# Patient Record
Sex: FEMALE | Race: BLACK OR AFRICAN AMERICAN | Employment: OTHER | URBAN - METROPOLITAN AREA
[De-identification: names, ages, dates, MRNs, and addresses within clinical notes are randomized per-mention and may not be internally consistent; named-entity substitution may affect disease eponyms.]

---

## 2017-01-03 ENCOUNTER — TELEPHONE (OUTPATIENT)
Dept: FAMILY MEDICINE CLINIC | Age: 47
End: 2017-01-03

## 2017-01-03 DIAGNOSIS — K42.9 UMBILICAL HERNIA WITHOUT OBSTRUCTION AND WITHOUT GANGRENE: Primary | ICD-10-CM

## 2017-01-03 NOTE — TELEPHONE ENCOUNTER
Pt called back, stating she called earlier to get the results of CT of her abdomen done 12/29/16 and wants call back to advise.

## 2017-01-03 NOTE — TELEPHONE ENCOUNTER
----- Message from Stephanie Alcantara sent at 1/3/2017 10:49 AM EST -----  Regarding: Dr. Abhi Fernandez Telephone  Patient would like a call back regarding her test results.  Contact number is [phone]

## 2017-01-04 NOTE — TELEPHONE ENCOUNTER
Please call patient and let her know she has a small umbilical hernia, and I would like for her to see surgery. I have printed a referral request.  In the mean time, it is OK to fly.

## 2017-01-04 NOTE — TELEPHONE ENCOUNTER
Pt called again requesting abdominal CT results and wants to make sure she's okay to travel. Please call to advise.

## 2017-01-04 NOTE — TELEPHONE ENCOUNTER
Called and spoke with pt, and she has been advised of her results. Pt states she would like to speak with Dr. Lydia Dotson personally about her results and would like for her to call her today.

## 2017-01-05 NOTE — TELEPHONE ENCOUNTER
Patient called back and was very upset that she has not spoken with the doctor. Please contact her back at [phone].

## 2017-01-17 ENCOUNTER — OFFICE VISIT (OUTPATIENT)
Dept: SURGERY | Age: 47
End: 2017-01-17

## 2017-01-17 VITALS
RESPIRATION RATE: 14 BRPM | HEART RATE: 89 BPM | HEIGHT: 67 IN | TEMPERATURE: 98.8 F | WEIGHT: 192 LBS | SYSTOLIC BLOOD PRESSURE: 103 MMHG | BODY MASS INDEX: 30.13 KG/M2 | OXYGEN SATURATION: 97 % | DIASTOLIC BLOOD PRESSURE: 66 MMHG

## 2017-01-17 DIAGNOSIS — K42.9 UMBILICAL HERNIA WITHOUT OBSTRUCTION AND WITHOUT GANGRENE: Primary | ICD-10-CM

## 2017-01-17 NOTE — PROGRESS NOTES
1. Have you been to the ER, urgent care clinic since your last visit? Hospitalized since your last visit?no    2. Have you seen or consulted any other health care providers outside of the [address] [place] [address] since your last visit? Include any pap smears or colon screening.  no

## 2017-01-20 NOTE — PROGRESS NOTES
Surgery Consult    Subjective:      Lennox Citizen is a 40 y.o. female who is being seen for umbilical hernia. Patient complains of an annoying discomfort at times at her umbilicus. She denies pain, nausea, bloating, and change in bowel habits. Symptoms were first noted a few weeks ago. Lump is reducible. Patient does not have symptoms of chronic constipation, chronic cough, difficulty urinating. Patient has a history of a BTL through the umbilicus. She developed a keloid at the site. She states she has a severe problem with keloids      Patient Active Problem List    Diagnosis Date Noted    Menorrhagia 09/19/2014    OA (osteoarthritis) of knee 03/10/2013    Varicose veins 09/14/2012    Iron deficiency anemia 08/03/2012    Menstrual migraine 07/26/2011      Past Medical History   Diagnosis Date    Acute PID (pelvic inflammatory disease) 12/04/13    Anemia     Anemia NEC     Iron deficiency anemia 8/3/2012    Menorrhagia 9/19/2014    Migraines     Varicose veins 9/14/2012     Past Surgical History   Procedure Laterality Date    Endoscopy, colon, diagnostic  02/09    Hx tubal ligation       Family History   Problem Relation Age of Onset    Hypertension Mother     Diabetes Mother     Cancer Mother      cervical    Hypertension Father     Diabetes Father     Hypertension Sister     Diabetes Sister     Diabetes Maternal Grandmother     Diabetes Maternal Grandfather     Diabetes Paternal Grandmother     Diabetes Paternal Grandfather     Hypertension Sister     Diabetes Sister       Social History   Substance Use Topics    Smoking status: Never Smoker    Smokeless tobacco: Never Used    Alcohol use No      No current outpatient prescriptions on file. No current facility-administered medications for this visit. No Known Allergies     Review of Systems:  A comprehensive review of systems was negative except for that written in the History of Present Illness.      Objective: Blood pressure 103/66, pulse 89, temperature 98.8 °F (37.1 °C), temperature source Oral, resp. rate 14, height 5' 7\" (1.702 m), weight 192 lb (87.1 kg), last menstrual period 12/25/2016, SpO2 97 %. Physical Exam:  General:  Alert, cooperative, no distress, appears stated age. Eyes:  Conjunctivae/corneas clear. , EOMs intact. Chest:   Large scar at breast biopsy site   Abdomen:   Soft, non-tender. Bowel sounds normal. No masses,  No organomegaly. Keloid at umbilicus. Reducible UH     CT (examed by me personally) -  Umbilical hernia, no bowel obstruction. Dictated as containing a loop of bowel but I disagree    Assessment:     Umbilical which is easily reducible. Recommend watchful waiting    Plan:     1. Discussed possibility of incarceration, strangulation, enlargement in size over time, and the risk of emergency surgery in the face of strangulation. Also discussed the risks of surgery including recurrence which can be up to 50% in the case of incisional or complex hernias, use of prosthetic materials (mesh) and the increased risk of infection, postoperative infection and the possible need for reoperation and removal of mesh if used, possibility of postoperative small bowel obstruction or ileus, and the risks of general anesthetic. The patient understands the risks; any and all questions were answered to the patient's satisfaction.     Signed By: Rufino Borja MD     January 20, 2017

## 2017-01-23 ENCOUNTER — OFFICE VISIT (OUTPATIENT)
Dept: FAMILY MEDICINE CLINIC | Age: 47
End: 2017-01-23

## 2017-01-23 VITALS
DIASTOLIC BLOOD PRESSURE: 83 MMHG | HEIGHT: 67 IN | SYSTOLIC BLOOD PRESSURE: 123 MMHG | WEIGHT: 190.2 LBS | TEMPERATURE: 98 F | RESPIRATION RATE: 16 BRPM | BODY MASS INDEX: 29.85 KG/M2 | HEART RATE: 81 BPM

## 2017-01-23 DIAGNOSIS — R07.9 CHEST PAIN, UNSPECIFIED TYPE: ICD-10-CM

## 2017-01-23 DIAGNOSIS — R07.89 CHEST WALL PAIN: Primary | ICD-10-CM

## 2017-01-23 NOTE — PATIENT INSTRUCTIONS
Musculoskeletal Chest Pain: Care Instructions  Your Care Instructions  Chest pain is not always a sign that something is wrong with your heart or that you have another serious problem. The doctor thinks your chest pain is caused by strained muscles or ligaments, inflamed chest cartilage, or another problem in your chest, rather than by your heart. You may need more tests to find the cause of your chest pain. Follow-up care is a key part of your treatment and safety. Be sure to make and go to all appointments, and call your doctor if you are having problems. Its also a good idea to know your test results and keep a list of the medicines you take. How can you care for yourself at home? · Take pain medicines exactly as directed. ¨ If the doctor gave you a prescription medicine for pain, take it as prescribed. ¨ If you are not taking a prescription pain medicine, ask your doctor if you can take an over-the-counter medicine. · Rest and protect the sore area. · Stop, change, or take a break from any activity that may be causing your pain or soreness. · Put ice or a cold pack on the sore area for 10 to 20 minutes at a time. Try to do this every 1 to 2 hours for the next 3 days (when you are awake) or until the swelling goes down. Put a thin cloth between the ice and your skin. · After 2 or 3 days, apply a heating pad set on low or a warm cloth to the area that hurts. Some doctors suggest that you go back and forth between hot and cold. · Do not wrap or tape your ribs for support. This may cause you to take smaller breaths, which could increase your risk of lung problems. · Mentholated creams such as Bengay or Icy Hot may soothe sore muscles. Follow the instructions on the package. · Follow your doctor's instructions for exercising. · Gentle stretching and massage may help you get better faster. Stretch slowly to the point just before pain begins, and hold the stretch for at least 15 to 30 seconds.  Do this 3 or 4 times a day. Stretch just after you have applied heat. · As your pain gets better, slowly return to your normal activities. Any increased pain may be a sign that you need to rest a while longer. When should you call for help? Call 911 anytime you think you may need emergency care. For example, call if:  · You have chest pain or pressure. This may occur with:  ¨ Sweating. ¨ Shortness of breath. ¨ Nausea or vomiting. ¨ Pain that spreads from the chest to the neck, jaw, or one or both shoulders or arms. ¨ Dizziness or lightheadedness. ¨ A fast or uneven pulse. After calling 911, chew 1 adult-strength aspirin. Wait for an ambulance. Do not try to drive yourself. · You have sudden chest pain and shortness of breath, or you cough up blood. Call your doctor now or seek immediate medical care if:  · You have any trouble breathing. · Your chest pain gets worse. · Your chest pain occurs consistently with exercise and is relieved by rest.  Watch closely for changes in your health, and be sure to contact your doctor if:  · Your chest pain does not get better after 1 week. Where can you learn more? Go to http://baldo-anay.info/. Enter V293 in the search box to learn more about \"Musculoskeletal Chest Pain: Care Instructions. \"  Current as of: May 27, 2016  Content Version: 11.1  © 2852-4619 [employer]. Care instructions adapted under license by [employer] (which disclaims liability or warranty for this information). If you have questions about a medical condition or this instruction, always ask your healthcare professional. [place] [address] any warranty or liability for your use of this information. Chest Pain: Care Instructions  Your Care Instructions  There are many things that can cause chest pain. Some are not serious and will get better on their own in a few days. But some kinds of chest pain need more testing and treatment. Your doctor may have recommended a follow-up visit in the next 8 to 12 hours. If you are not getting better, you may need more tests or treatment. Even though your doctor has released you, you still need to watch for any problems. The doctor carefully checked you, but sometimes problems can develop later. If you have new symptoms or if your symptoms do not get better, get medical care right away. If you have worse or different chest pain or pressure that lasts more than 5 minutes or you passed out (lost consciousness), call 911 or seek other emergency help right away. A medical visit is only one step in your treatment. Even if you feel better, you still need to do what your doctor recommends, such as going to all suggested follow-up appointments and taking medicines exactly as directed. This will help you recover and help prevent future problems. How can you care for yourself at home? · Rest until you feel better. · Take your medicine exactly as prescribed. Call your doctor if you think you are having a problem with your medicine. · Do not drive after taking a prescription pain medicine. When should you call for help? Call 911 if:  · You passed out (lost consciousness). · You have severe difficulty breathing. · You have symptoms of a heart attack. These may include:  ¨ Chest pain or pressure, or a strange feeling in your chest.  ¨ Sweating. ¨ Shortness of breath. ¨ Nausea or vomiting. ¨ Pain, pressure, or a strange feeling in your back, neck, jaw, or upper belly or in one or both shoulders or arms. ¨ Lightheadedness or sudden weakness. ¨ A fast or irregular heartbeat. After you call 911, the  may tell you to chew 1 adult-strength or 2 to 4 low-dose aspirin. Wait for an ambulance. Do not try to drive yourself. Call your doctor today if:  · You have any trouble breathing. · Your chest pain gets worse. · You are dizzy or lightheaded, or you feel like you may faint.   · You are not getting better as expected. · You are having new or different chest pain. Where can you learn more? Go to http://baldo-anay.info/. Enter A120 in the search box to learn more about \"Chest Pain: Care Instructions. \"  Current as of: May 27, 2016  Content Version: 11.1  © 3327-9763 [employer]. Care instructions adapted under license by [employer] (which disclaims liability or warranty for this information). If you have questions about a medical condition or this instruction, always ask your healthcare professional. [address] any warranty or liability for your use of this information.

## 2017-01-23 NOTE — MR AVS SNAPSHOT
Visit Information Date & Time Provider Department Dept. Phone Encounter #  
 1/23/2017  8:15 AM Brad [facility]amon[facility] [id number] Follow-up Instructions Return if symptoms worsen or fail to improve. Upcoming Health Maintenance Date Due DTaP/Tdap/Td series (2 - Tdap) 10/10/2006 BREAST CANCER SCRN MAMMOGRAM 7/26/2017 PAP AKA CERVICAL CYTOLOGY 7/21/2021 Allergies as of 1/23/2017  Review Complete On: 1/23/2017 By: Brad Johnston MD  
 No Known Allergies Current Immunizations  Reviewed on 12/21/2015 Name Date Diphtheria, Tetanus, Whole Cell (Dtp) 10/7/1975, 9/20/1973, 9/15/1972, 7/7/1972 IPV 2/9/1988, 9/20/1972, 9/15/1972, 7/7/1972 MMR Vaccine 5/9/1974 TD Vaccine 10/9/2006, 2/9/1988 Not reviewed this visit You Were Diagnosed With   
  
 Codes Comments Chest wall pain    -  Primary ICD-10-CM: R07.89 ICD-9-CM: 786.52 Chest pain, unspecified type     ICD-10-CM: R07.9 ICD-9-CM: 786.50 Vitals BP Pulse Temp Resp Height(growth percentile) Weight(growth percentile) 123/83 (BP 1 Location: Left arm, BP Patient Position: Sitting) 81 98 °F (36.7 °C) (Oral) 16 5' 7\" (1.702 m) 190 lb 3.2 oz (86.3 kg) LMP BMI OB Status Smoking Status 12/25/2016 (Exact Date) 29.79 kg/m2 Having regular periods Never Smoker BMI and BSA Data Body Mass Index Body Surface Area  
 29.79 kg/m 2 2.02 m 2 Preferred Pharmacy Pharmacy Name Phone [id number] [facility] [phone] Your Updated Medication List  
  
Notice  As of 1/23/2017  9:11 AM  
 You have not been prescribed any medications. We Performed the Following AMB POC EKG ROUTINE W/ 12 LEADS, INTER & REP [13775 CPT(R)] REFERRAL TO CARDIOLOGY [CGE69 Custom]  Comments:  
 Please evaluate patient for chest wall pain, patient concerned about a cardiac problem. Follow-up Instructions Return if symptoms worsen or fail to improve. To-Do List   
 01/23/2017 Imaging:  XR CHEST PA LAT Referral Information Referral ID Referred By Referred To  
  
 [id number] Jazmin Bryson MD   
   [address] [place] [address] Phone: [phone] Fax: [phone] Visits Status Start Date End Date 1 New Request 1/23/17 1/23/18 If your referral has a status of pending review or denied, additional information will be sent to support the outcome of this decision. Patient Instructions Musculoskeletal Chest Pain: Care Instructions Your Care Instructions Chest pain is not always a sign that something is wrong with your heart or that you have another serious problem. The doctor thinks your chest pain is caused by strained muscles or ligaments, inflamed chest cartilage, or another problem in your chest, rather than by your heart. You may need more tests to find the cause of your chest pain. Follow-up care is a key part of your treatment and safety. Be sure to make and go to all appointments, and call your doctor if you are having problems. Its also a good idea to know your test results and keep a list of the medicines you take. How can you care for yourself at home? · Take pain medicines exactly as directed. ¨ If the doctor gave you a prescription medicine for pain, take it as prescribed. ¨ If you are not taking a prescription pain medicine, ask your doctor if you can take an over-the-counter medicine. · Rest and protect the sore area. · Stop, change, or take a break from any activity that may be causing your pain or soreness. · Put ice or a cold pack on the sore area for 10 to 20 minutes at a time. Try to do this every 1 to 2 hours for the next 3 days (when you are awake) or until the swelling goes down. Put a thin cloth between the ice and your skin. · After 2 or 3 days, apply a heating pad set on low or a warm cloth to the area that hurts. Some doctors suggest that you go back and forth between hot and cold. · Do not wrap or tape your ribs for support. This may cause you to take smaller breaths, which could increase your risk of lung problems. · Mentholated creams such as Bengay or Icy Hot may soothe sore muscles. Follow the instructions on the package. · Follow your doctor's instructions for exercising. · Gentle stretching and massage may help you get better faster. Stretch slowly to the point just before pain begins, and hold the stretch for at least 15 to 30 seconds. Do this 3 or 4 times a day. Stretch just after you have applied heat. · As your pain gets better, slowly return to your normal activities. Any increased pain may be a sign that you need to rest a while longer. When should you call for help? Call 911 anytime you think you may need emergency care. For example, call if: 
· You have chest pain or pressure. This may occur with: ¨ Sweating. ¨ Shortness of breath. ¨ Nausea or vomiting. ¨ Pain that spreads from the chest to the neck, jaw, or one or both shoulders or arms. ¨ Dizziness or lightheadedness. ¨ A fast or uneven pulse. After calling 911, chew 1 adult-strength aspirin. Wait for an ambulance. Do not try to drive yourself. · You have sudden chest pain and shortness of breath, or you cough up blood. Call your doctor now or seek immediate medical care if: 
· You have any trouble breathing. · Your chest pain gets worse. · Your chest pain occurs consistently with exercise and is relieved by rest. 
Watch closely for changes in your health, and be sure to contact your doctor if: 
· Your chest pain does not get better after 1 week. Where can you learn more? Go to http://baldo-anay.info/. Enter V293 in the search box to learn more about \"Musculoskeletal Chest Pain: Care Instructions. \" 
 Current as of: May 27, 2016 Content Version: 11.1 © 5477-0687 [employer]. Care instructions adapted under license by [employer] (which disclaims liability or warranty for this information). If you have questions about a medical condition or this instruction, always ask your healthcare professional. [address] any warranty or liability for your use of this information. Chest Pain: Care Instructions Your Care Instructions There are many things that can cause chest pain. Some are not serious and will get better on their own in a few days. But some kinds of chest pain need more testing and treatment. Your doctor may have recommended a follow-up visit in the next 8 to 12 hours. If you are not getting better, you may need more tests or treatment. Even though your doctor has released you, you still need to watch for any problems. The doctor carefully checked you, but sometimes problems can develop later. If you have new symptoms or if your symptoms do not get better, get medical care right away. If you have worse or different chest pain or pressure that lasts more than 5 minutes or you passed out (lost consciousness), call 911 or seek other emergency help right away. A medical visit is only one step in your treatment. Even if you feel better, you still need to do what your doctor recommends, such as going to all suggested follow-up appointments and taking medicines exactly as directed. This will help you recover and help prevent future problems. How can you care for yourself at home? · Rest until you feel better. · Take your medicine exactly as prescribed. Call your doctor if you think you are having a problem with your medicine. · Do not drive after taking a prescription pain medicine. When should you call for help? Call 911 if: 
· You passed out (lost consciousness). · You have severe difficulty breathing. · You have symptoms of a heart attack. These may include: ¨ Chest pain or pressure, or a strange feeling in your chest. 
¨ Sweating. ¨ Shortness of breath. ¨ Nausea or vomiting. ¨ Pain, pressure, or a strange feeling in your back, neck, jaw, or upper belly or in one or both shoulders or arms. ¨ Lightheadedness or sudden weakness. ¨ A fast or irregular heartbeat. After you call 911, the  may tell you to chew 1 adult-strength or 2 to 4 low-dose aspirin. Wait for an ambulance. Do not try to drive yourself. Call your doctor today if: 
· You have any trouble breathing. · Your chest pain gets worse. · You are dizzy or lightheaded, or you feel like you may faint. · You are not getting better as expected. · You are having new or different chest pain. Where can you learn more? Go to http://baldo-anay.info/. Enter A120 in the search box to learn more about \"Chest Pain: Care Instructions. \" Current as of: May 27, 2016 Content Version: 11.1 © 6581-6429 [employer]. Care instructions adapted under license by [employer] (which disclaims liability or warranty for this information). If you have questions about a medical condition or this instruction, always ask your healthcare professional. [address] any warranty or liability for your use of this information. Introducing [facility]! Dear Nicole Rosas: Thank you for requesting a [employer] account. Our records indicate that you already have an active [employer] account. You can access your account anytime at [email or web address] Did you know that you can access your hospital and ER discharge instructions at any time in [employer]? You can also review all of your test results from your hospital stay or ER visit. Additional Information If you have questions, please visit the Frequently Asked Questions section of the [employer] website at [email or web address] Remember, [employer] is NOT to be used for urgent needs. For medical emergencies, dial 911. Now available from your iPhone and Android! Please provide this summary of care documentation to your next provider. Your primary care clinician is listed as Lashae Fritz. If you have any questions after today's visit, please call [phone].

## 2017-01-23 NOTE — PROGRESS NOTES
HISTORY OF PRESENT ILLNESS  Jammie Elkins is a 40 y.o. female. Chest Pain    The history is provided by the patient (no cardiac risk factors). This is a new problem. Episode onset: 2-3 weeks ago. The problem has not changed since onset. Episode Length: 3-5 minutes. Episode frequency: 3-4 times a day. The pain is associated with normal activity. The pain is present in the substernal region. The pain is moderate. Quality: aching to throbbing. The pain does not radiate (tingling in left upper arm twice, but not necessarily associated with the pain). The symptoms are aggravated by movement. Pertinent negatives include no cough, no diaphoresis, no dizziness, no irregular heartbeat, no lower extremity edema, no nausea, no near-syncope, no palpitations, no shortness of breath and no vomiting. She has tried nothing for the symptoms. Risk factors include no risk factors. Her past medical history does not include aneurysm, cancer, DM, DVT, HTN or PE. Past workup comments: no cardiac testing. Review of Systems   Constitutional: Negative for diaphoresis. Respiratory: Negative for cough and shortness of breath. Cardiovascular: Positive for chest pain. Negative for palpitations, leg swelling and near-syncope. Gastrointestinal: Negative for nausea and vomiting. Neurological: Negative for dizziness. Visit Vitals    /83 (BP 1 Location: Left arm, BP Patient Position: Sitting)    Pulse 81    Temp 98 °F (36.7 °C) (Oral)    Resp 16    Ht 5' 7\" (1.702 m)    Wt 190 lb 3.2 oz (86.3 kg)    LMP 12/25/2016 (Exact Date)    BMI 29.79 kg/m2     Physical Exam   Constitutional: She is oriented to person, place, and time. She appears well-developed and well-nourished. No distress. Cardiovascular: Normal rate, regular rhythm and normal heart sounds. Exam reveals no gallop and no friction rub. No murmur heard. Pulmonary/Chest: Effort normal and breath sounds normal. No respiratory distress.  She has no wheezes. She has no rales. She exhibits tenderness. Musculoskeletal: She exhibits no edema. Arms:  Neurological: She is alert and oriented to person, place, and time. Skin: Skin is warm and dry. She is not diaphoretic. Nursing note and vitals reviewed. EKG - NSR without STTW changes, normal intervals and axis, no hypertrophy     ASSESSMENT and PLAN    ICD-10-CM ICD-9-CM    1. Chest wall pain R07.89 786.52 XR CHEST PA LAT      REFERRAL TO CARDIOLOGY      CANCELED: REFERRAL TO CARDIOLOGY   2. Chest pain, unspecified type R07.9 786.50 AMB POC EKG ROUTINE W/ 12 LEADS, INTER & REP      Musculoskeletal  Heat, Advil prn  chest X-ray  I have reassured her that this is benign, however, she wants to know for sure if it is her heart or not, and would like to see Cardiology. Discussed the fact that there is no definitive, non-invasive test for CAD and that she is very low risk. Cardiac CT mentioned, but she would need to pay out of pocket. She is going to wait a few weeks and see if this resolves and will schedule with Cardiology if it doesn't. Information on chest pain given for her to review. Follow-up Disposition:  Return if symptoms worsen or fail to improve. Reviewed plan of care. Patient has provided input and agrees with goals.

## 2017-02-02 ENCOUNTER — OFFICE VISIT (OUTPATIENT)
Dept: CARDIOLOGY CLINIC | Age: 47
End: 2017-02-02

## 2017-02-02 VITALS
RESPIRATION RATE: 16 BRPM | HEART RATE: 80 BPM | WEIGHT: 188.8 LBS | DIASTOLIC BLOOD PRESSURE: 80 MMHG | OXYGEN SATURATION: 98 % | SYSTOLIC BLOOD PRESSURE: 128 MMHG | HEIGHT: 67 IN | BODY MASS INDEX: 29.63 KG/M2

## 2017-02-02 DIAGNOSIS — R06.00 DYSPNEA, UNSPECIFIED TYPE: ICD-10-CM

## 2017-02-02 DIAGNOSIS — R07.89 CHEST TIGHTNESS: Primary | ICD-10-CM

## 2017-02-02 NOTE — PROGRESS NOTES
Batsheva Brown MD    [address] [facility] [place], [address] [address]    Office [phone],[facility] [phone]  Pager [phone]    Yvonne Barbour is a 40 y.o. female referred for evaluation of chest tightness/dyspnea consult requested by Kavon Thompson MD      Primary care physician:  Kavon Thompson MD    Dear Dr Kavitha Morejon,    I had the pleasure of seeing Ms Yvonne Barbour in the office today. Chief complaint:  Yvonne Barbour is a 40 y.o. female who complains of   Chief Complaint   Patient presents with   [address] [facility] Patient     chest tightness, SOB       Assessment:  Chest Pain  Dyspnea        Plan:   Stress test ( reg)  ECHO  F/u based on cardiac w/u or earlier prn  Aggressive CV risk factor modification    Patient understands the plan. All questions were answered to the patient's satisfaction. Medication Side Effects and Warnings were discussed with patient: yes  Patient Labs were reviewed and or requested:  yes  Patient Past Records were reviewed and or requested: yes    I appreciate the opportunity to be involved in Ms. Kearney. Please see note below for details. Please do not hesitate to contact us with questions or concerns. Batsheva Brown MD    Cardiac Testing/ Procedures: A. Cardiac Cath/PCI:    B.ECHO/LYUBOV:    C.StressNuclear/Stress ECHO/Stress test:    D.Vascular:    E. EP:    F. 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 No palpitations, dizziness, syncope, presyncope. No prior history of CAD. Non-smoker.       Past Medical History   Diagnosis Date    Acute PID (pelvic inflammatory disease) 12/04/13    Anemia     Anemia NEC     Iron deficiency anemia 8/3/2012    Menorrhagia 9/19/2014    Migraines     Varicose veins 9/14/2012      Past Surgical History   Procedure Laterality Date    Endoscopy, colon, diagnostic  02/09    Hx tubal ligation       Family History   Problem Relation Age of Onset    Hypertension Mother     Diabetes Mother     Cancer Mother      cervical    Hypertension Father     Diabetes Father     Hypertension Sister     Diabetes Sister     Diabetes Maternal Grandmother     Diabetes Maternal Grandfather     Diabetes Paternal Grandmother     Diabetes Paternal Grandfather     Hypertension Sister     Diabetes Sister       Social History   Substance Use Topics    Smoking status: Never Smoker    Smokeless tobacco: Never Used    Alcohol use No          Medications before admission:    No current outpatient prescriptions on file. No current facility-administered medications for this visit. Review of Systems:  (bold if positive, if negative)    Gen:  Eyes:  ENT:  CVS:  Pulm:  GI:    :    MS:  Shoulder PainSkin:  Psych:  Endo:    Hem:  Renal:    Neuro:        Physical Exam:  Visit Vitals    /80 (BP 1 Location: Left arm)    Pulse 80    Resp 16    Ht 5' 7\" (1.702 m)    Wt 188 lb 12.8 oz (85.6 kg)    LMP 12/25/2016 (Exact Date)    SpO2 98%    BMI 29.57 kg/m2          Gen: Well-developed, well-nourished, in no acute distress  HEENT:  Pink conjunctivae, hearing intact to voice, moist mucous membranes  Neck: Supple,No JVD, No Carotid Bruit, Thyroid- non tender  Resp: No accessory muscle use, Clear breath sounds, No rales or rhonchi  Card: Regular Rate,Rythm,Normal S1, S2, No murmurs, rubs or gallop. No thrills.    Abd:  Soft, non-tender, non-distended, normoactive bowel sounds are present,   MSK: No cyanosis  Skin: No rashes   Neuro: moving all four extremities, no focal deficit, follows commands appropriately  Psych:  Good insight, oriented to person, place and time, alert, Nml Affect  LE: No edema  Vascular: Radial Pulses 2+ and symmetric        EKG: SR/Nml axis/Nml intervals      Cxray:    LABS:    No results for input(s): CPK, TROIQ in the last 72 hours.     No lab exists for component: CKQMB, CPKMB    Lab Results   Component Value Date/Time    WBC 5.9 11/19/2015 10:14 AM    HGB 10.1 11/19/2015 10:14 AM    HCT 33.3 11/19/2015 10:14 AM    PLATELET 427 92/58/3615 10:14 AM    MCV 80 11/19/2015 10:14 AM     Lab Results   Component Value Date/Time    Sodium 137 11/19/2015 10:14 AM    Potassium 4.0 11/19/2015 10:14 AM    Chloride 99 11/19/2015 10:14 AM    CO2 23 11/19/2015 10:14 AM    Anion gap 7 12/04/2013 10:40 AM    Glucose 86 11/19/2015 10:14 AM    BUN 8 11/19/2015 10:14 AM    Creatinine 0.97 11/19/2015 10:14 AM    BUN/Creatinine ratio 8 11/19/2015 10:14 AM    GFR est AA 82 11/19/2015 10:14 AM    GFR est non-AA 71 11/19/2015 10:14 AM    Calcium 9.4 11/19/2015 10:14 AM             Gil Nash MD

## 2017-02-02 NOTE — MR AVS SNAPSHOT
Visit Information Date & Time Provider Department Dept. Phone Encounter #  
 2/2/2017  1:40 PM Batsheva Brown MD [facility] S[facility] Lazaro [phone] [id number] Your Appointments 2/17/2017  2:00 PM  
STRESS TEST with NITA PFEIFFER  
[facility] ([facility]VIS SCHEDULING) Appt Note: STRESS TEST AT 2 AT 3 ECHO PER DR MURRAY  
 [address] [facility] [address]  
[phone]  
  
   
 [address] [place] [address]  
  
    
 2/17/2017  3:00 PM  
ECHO CARDIOGRAMS 2D with ECHO, STFRANCIS  
[facility] ([address]) Appt Note: STRESS TEST AT 2 AT 3 ECHO PER DR MURRAY  
 [address] [place] [address] Upcoming Health Maintenance Date Due DTaP/Tdap/Td series (2 - Tdap) 10/10/2006 BREAST CANCER SCRN MAMMOGRAM 7/26/2017 PAP AKA CERVICAL CYTOLOGY 7/21/2021 Allergies as of 2/2/2017  Review Complete On: 2/2/2017 By: Eliza Silverio No Known Allergies Current Immunizations  Reviewed on 12/21/2015 Name Date Diphtheria, Tetanus, Whole Cell (Dtp) 10/7/1975, 9/20/1973, 9/15/1972, 7/7/1972 IPV 2/9/1988, 9/20/1972, 9/15/1972, 7/7/1972 MMR Vaccine 5/9/1974 TD Vaccine 10/9/2006, 2/9/1988 Not reviewed this visit Vitals BP Pulse Resp Height(growth percentile) Weight(growth percentile) LMP  
 128/80 (BP 1 Location: Left arm) 80 16 5' 7\" (1.702 m) 188 lb 12.8 oz (85.6 kg) 12/25/2016 (Exact Date) SpO2 BMI OB Status Smoking Status 98% 29.57 kg/m2 Having regular periods Never Smoker Vitals History BMI and BSA Data Body Mass Index Body Surface Area  
 29.57 kg/m 2 2.01 m 2 Preferred Pharmacy Pharmacy Name Phone  [id number]- [facility] AT [address] [phone] Your Updated Medication List  
  
Notice  As of 2/2/2017  2:49 PM  
 You have not been prescribed any medications. Introducing [facility]! Dear Enid Pineda: Thank you for requesting a [employer] account. Our records indicate that you already have an active [employer] account. You can access your account anytime at [email or web address] Did you know that you can access your hospital and ER discharge instructions at any time in Pipeline[employer]? You can also review all of your test results from your hospital stay or ER visit. Additional Information If you have questions, please visit the Frequently Asked Questions section of the Pipeline[employer] website at [email or web address] Remember, Pipeline[employer] is NOT to be used for urgent needs. For medical emergencies, dial 911. Now available from your iPhone and Android! Please provide this summary of care documentation to your next provider. Your primary care clinician is listed as Syed York. If you have any questions after today's visit, please call [phone].

## 2017-02-03 ENCOUNTER — TELEPHONE (OUTPATIENT)
Dept: FAMILY MEDICINE CLINIC | Age: 47
End: 2017-02-03

## 2017-02-03 RX ORDER — TRIAMCINOLONE ACETONIDE 1 MG/G
CREAM TOPICAL 2 TIMES DAILY
Qty: 15 G | Refills: 0 | Status: SHIPPED | OUTPATIENT
Start: 2017-02-03 | End: 2018-05-31

## 2017-02-03 NOTE — TELEPHONE ENCOUNTER
Patient has tried the hydrocortisone and states it is not working. She would like to know if there is perhaps another treatment option.

## 2017-02-07 NOTE — TELEPHONE ENCOUNTER
Patient called to check on the status of this medication issue and was upset that she did not receive a phone call to notify her of the new prescription sent to her pharmacy.

## 2017-10-10 ENCOUNTER — HOSPITAL ENCOUNTER (OUTPATIENT)
Dept: ULTRASOUND IMAGING | Age: 47
Discharge: HOME | End: 2017-10-10
Attending: FAMILY MEDICINE
Payer: SELF-PAY

## 2017-10-10 DIAGNOSIS — R10.2 PELVIC AND PERINEAL PAIN: ICD-10-CM

## 2017-10-10 PROCEDURE — 76856 US EXAM PELVIC COMPLETE: CPT

## 2017-10-10 PROCEDURE — 76830 TRANSVAGINAL US NON-OB: CPT

## 2017-10-10 PROCEDURE — 76700 US EXAM ABDOM COMPLETE: CPT

## 2018-05-31 ENCOUNTER — OFFICE VISIT (OUTPATIENT)
Dept: FAMILY MEDICINE CLINIC | Age: 48
End: 2018-05-31

## 2018-05-31 VITALS
WEIGHT: 182 LBS | HEIGHT: 67 IN | DIASTOLIC BLOOD PRESSURE: 86 MMHG | RESPIRATION RATE: 20 BRPM | BODY MASS INDEX: 28.56 KG/M2 | TEMPERATURE: 98 F | SYSTOLIC BLOOD PRESSURE: 129 MMHG | HEART RATE: 92 BPM

## 2018-05-31 DIAGNOSIS — R80.9 PROTEINURIA, UNSPECIFIED TYPE: ICD-10-CM

## 2018-05-31 DIAGNOSIS — R10.2 PELVIC PAIN: Primary | ICD-10-CM

## 2018-05-31 DIAGNOSIS — Z12.11 SPECIAL SCREENING FOR MALIGNANT NEOPLASMS, COLON: ICD-10-CM

## 2018-05-31 LAB
BILIRUB UR QL STRIP: NEGATIVE
GLUCOSE UR-MCNC: NEGATIVE MG/DL
HCG URINE, QL. (POC): NEGATIVE
KETONES P FAST UR STRIP-MCNC: ABNORMAL MG/DL
PH UR STRIP: 8.5 [PH] (ref 4.6–8)
PROT UR QL STRIP: ABNORMAL
SP GR UR STRIP: 1.01 (ref 1–1.03)
UA UROBILINOGEN AMB POC: ABNORMAL (ref 0.2–1)
URINALYSIS CLARITY POC: CLEAR
URINALYSIS COLOR POC: YELLOW
URINE BLOOD POC: NEGATIVE
URINE LEUKOCYTES POC: NEGATIVE
URINE NITRITES POC: NEGATIVE
VALID INTERNAL CONTROL?: YES

## 2018-05-31 RX ORDER — THYROID, PORCINE 30 MG/1
TABLET ORAL
Refills: 2 | COMMUNITY
Start: 2018-05-14 | End: 2018-05-31

## 2018-05-31 RX ORDER — LEVOTHYROXINE SODIUM 100 UG/1
TABLET ORAL
COMMUNITY

## 2018-05-31 NOTE — MR AVS SNAPSHOT
[address] 
[phone] Patient: Dante Horn MRN: [id number] MSZ:[id number] Visit Information Date & Time Provider Department Dept. Phone Encounter #  
 5/31/2018 11:00 AM Edgard Bellkorina [address] [id number] Upcoming Health Maintenance Date Due DTaP/Tdap/Td series (2 - Tdap) 10/10/2006 BREAST CANCER SCRN MAMMOGRAM 7/26/2017 Influenza Age 5 to Adult 8/1/2018 PAP AKA CERVICAL CYTOLOGY 7/21/2021 Allergies as of 5/31/2018  Review Complete On: 5/31/2018 By: Danyel Lopez MD  
 No Known Allergies Current Immunizations  Reviewed on 12/21/2015 Name Date Diphtheria, Tetanus, Whole Cell (Dtp) 10/7/1975, 9/20/1973, 9/15/1972, 7/7/1972 IPV 2/9/1988, 9/20/1972, 9/15/1972, 7/7/1972 MMR Vaccine 5/9/1974 TD Vaccine 10/9/2006, 2/9/1988 Not reviewed this visit You Were Diagnosed With   
  
 Codes Comments Pelvic pain    -  Primary ICD-10-CM: R10.2 ICD-9-CM: [id number] Special screening for malignant neoplasms, colon     ICD-10-CM: Z12.11 ICD-9-CM: V76.51 Vitals BP Pulse Temp Resp Height(growth percentile) Weight(growth percentile) 129/86 92 98 °F (36.7 °C) (Oral) 20 5' 7\" (1.702 m) 182 lb (82.6 kg) BMI OB Status Smoking Status 28.51 kg/m2 Having regular periods Never Smoker Vitals History BMI and BSA Data Body Mass Index Body Surface Area 28.51 kg/m 2 1.98 m 2 Preferred Pharmacy Pharmacy Name Phone [id number] [phone] [facility] [phone] Your Updated Medication List  
  
   
This list is accurate as of 5/31/18 12:13 PM.  Always use your most recent med list.  
  
  
  
  
 SYNTHROID 100 mcg tablet Generic drug:  levothyroxine Take  by mouth Daily (before breakfast). We Performed the Following AMB POC URINALYSIS DIP STICK AUTO W/O MICRO [10958 CPT(R)] AMB POC URINE PREGNANCY TEST, VISUAL COLOR COMPARISON [86841 CPT(R)] [address] [id number] Custom] OCCULT BLOOD, IMMUNOASSAY (FIT) [id number] CPT(R)] To-Do List   
 Around 06/04/2018 Imaging:  US PELV NON OBS Around 06/04/2018 Imaging:  US TRANSVAGINAL Introducing [facility]! Dear Dary Williamson: Thank you for requesting a Krypt[employer] account. Our records indicate that you already have an active Kryptiq account. You can access your account anytime at [email or web address] Did you know that you can access your hospital and ER discharge instructions at any time in Kryptiq? You can also review all of your test results from your hospital stay or ER visit. Additional Information If you have questions, please visit the Frequently Asked Questions section of the K[employer] website at [email or web address] Remember, Kryptiq is NOT to be used for urgent needs. For medical emergencies, dial 911. Now available from your iPhone and Android! Please provide this summary of care documentation to your next provider. Your primary care clinician is listed as Tiffanie Andrade. If you have any questions after today's visit, please call [phone].

## 2018-05-31 NOTE — PROGRESS NOTES
HISTORY OF PRESENT ILLNESS  Estevan Dixon is a 56 y.o. female. HPI Comments: Estevan Dixon is here due to sharp, shooting, vaginal pain radiating to her rectum for about a month. It is worse and lasts up to a week after intercourse. She has no pain when she does not have intercourse. There is a yellowish, mucoid vaginal discharge discharge for about 3 days after intercourse as well. She had a pelvic ultrasound in October showing a 2.5 cm fibroid. No partner changes and she is  with a single partner. Her relationship with her  is good. Denies constipation. Review of Systems   Constitutional: Negative for chills, fever and malaise/fatigue. Gastrointestinal: Negative for abdominal pain, blood in stool, constipation, diarrhea and melena. Rectal pain   Genitourinary: Positive for flank pain. Negative for dysuria, frequency, hematuria and urgency. Pelvic pain. She had right flank pain once last night. Visit Vitals    /86    Pulse 92    Temp 98 °F (36.7 °C) (Oral)    Resp 20    Ht 5' 7\" (1.702 m)    Wt 182 lb (82.6 kg)    BMI 28.51 kg/m2     Physical Exam   Constitutional: She is oriented to person, place, and time. She appears well-developed and well-nourished. No distress. Cardiovascular: Normal rate, regular rhythm and normal heart sounds. Exam reveals no gallop and no friction rub. No murmur heard. Pulmonary/Chest: Effort normal and breath sounds normal. No respiratory distress. She has no wheezes. She has no rales. Abdominal: Soft. Normal appearance and bowel sounds are normal. She exhibits no distension. There is tenderness in the suprapubic area. There is no rebound, no guarding and no CVA tenderness. Genitourinary: Rectum normal and vagina normal. There is no rash, tenderness or lesion on the right labia. There is no rash, tenderness or lesion on the left labia. Uterus is not deviated, not enlarged, not fixed and not tender.  Cervix exhibits no motion tenderness, no discharge and no friability. Right adnexum displays no mass and no tenderness. Left adnexum displays tenderness. Left adnexum displays no mass. Genitourinary Comments: Uterus irregular. Neurological: She is alert and oriented to person, place, and time. Skin: Skin is warm and dry. She is not diaphoretic. Nursing note and vitals reviewed. Urine dipstick shows positive for protein and positive for ketones. Urine HCG - Negative    ASSESSMENT and PLAN    ICD-10-CM ICD-9-CM    1. Pelvic pain R10.2 [id number] levothyroxine (SYNTHROID) 100 mcg tablet      AMB POC URINE PREGNANCY TEST, VISUAL COLOR COMPARISON      AMB POC URINALYSIS DIP STICK AUTO W/O MICRO      NUSWAB VAGINITIS PLUS      US PELV NON OBS      US TRANSVAGINAL      CANCELED: NUSWAB VAGINITIS PLUS   2. Proteinuria, unspecified type R80.9 791.0 PROT+CREATU (RANDOM)   3. Special screening for malignant neoplasms, colon Z12.11 V76.51 OCCULT BLOOD, IMMUNOASSAY (FIT)        Likely ovarian cyst  Pelvic ultrasound  Labs per orders. Follow-up Disposition:  Return pending test results. Reviewed plan of care. Patient has provided input and agrees with goals.

## 2018-05-31 NOTE — PROGRESS NOTES
Chief Complaint   Patient presents with    Abdominal Pain    Pelvic Pain     Health Maintenance   Topic Date Due    DTaP/Tdap/Td series (2 - Tdap) 10/10/2006    BREAST CANCER SCRN MAMMOGRAM  07/26/2017    Influenza Age 5 to Adult  08/01/2018    PAP AKA CERVICAL CYTOLOGY  07/21/2021

## 2018-06-01 LAB
CREAT UR-MCNC: 228.2 MG/DL
PROT UR-MCNC: 17.4 MG/DL
PROT/CREAT UR: 76 MG/G CREAT (ref 0–200)

## 2018-06-04 LAB
A VAGINAE DNA VAG QL NAA+PROBE: ABNORMAL SCORE
BVAB2 DNA VAG QL NAA+PROBE: ABNORMAL SCORE
C ALBICANS DNA VAG QL NAA+PROBE: NEGATIVE
C GLABRATA DNA VAG QL NAA+PROBE: NEGATIVE
C TRACH RRNA SPEC QL NAA+PROBE: NEGATIVE
MEGA1 DNA VAG QL NAA+PROBE: ABNORMAL SCORE
N GONORRHOEA RRNA SPEC QL NAA+PROBE: NEGATIVE
T VAGINALIS RRNA SPEC QL NAA+PROBE: NEGATIVE

## 2018-06-05 ENCOUNTER — TELEPHONE (OUTPATIENT)
Dept: FAMILY MEDICINE CLINIC | Age: 48
End: 2018-06-05

## 2018-06-05 LAB — HEMOCCULT STL QL IA: NEGATIVE

## 2018-06-05 RX ORDER — METRONIDAZOLE 500 MG/1
500 TABLET ORAL 2 TIMES DAILY
Qty: 14 TAB | Refills: 0 | Status: SHIPPED | OUTPATIENT
Start: 2018-06-05 | End: 2018-06-12

## 2018-06-05 NOTE — TELEPHONE ENCOUNTER
Please call patient and let her know she may have a bacterial infection. I have sent a prescription to her pharmacy. Rosemary Castillo

## 2021-06-08 ENCOUNTER — TRANSCRIBE ORDER (OUTPATIENT)
Dept: SCHEDULING | Age: 51
End: 2021-06-08

## 2021-06-08 DIAGNOSIS — Z12.31 SCREENING MAMMOGRAM FOR HIGH-RISK PATIENT: Primary | ICD-10-CM

## 2021-07-21 ENCOUNTER — HOSPITAL ENCOUNTER (OUTPATIENT)
Dept: MAMMOGRAPHY | Age: 51
Discharge: HOME | End: 2021-07-21
Attending: FAMILY MEDICINE
Payer: COMMERCIAL

## 2021-07-21 DIAGNOSIS — Z12.31 SCREENING MAMMOGRAM FOR HIGH-RISK PATIENT: ICD-10-CM

## 2021-07-21 PROCEDURE — 77063 BREAST TOMOSYNTHESIS BI: CPT
